# Patient Record
Sex: MALE | Race: WHITE
[De-identification: names, ages, dates, MRNs, and addresses within clinical notes are randomized per-mention and may not be internally consistent; named-entity substitution may affect disease eponyms.]

---

## 2017-04-17 ENCOUNTER — HOSPITAL ENCOUNTER (EMERGENCY)
Dept: HOSPITAL 62 - ER | Age: 27
Discharge: HOME | End: 2017-04-17
Payer: COMMERCIAL

## 2017-04-17 VITALS — DIASTOLIC BLOOD PRESSURE: 72 MMHG | SYSTOLIC BLOOD PRESSURE: 127 MMHG

## 2017-04-17 DIAGNOSIS — Z87.442: ICD-10-CM

## 2017-04-17 DIAGNOSIS — N20.0: Primary | ICD-10-CM

## 2017-04-17 DIAGNOSIS — R10.31: ICD-10-CM

## 2017-04-17 DIAGNOSIS — R11.10: ICD-10-CM

## 2017-04-17 LAB
APPEARANCE UR: (no result)
BILIRUB UR QL STRIP: (no result)
GLUCOSE UR STRIP-MCNC: NEGATIVE MG/DL
KETONES UR STRIP-MCNC: (no result) MG/DL
NITRITE UR QL STRIP: NEGATIVE
PH UR STRIP: 5 [PH] (ref 5–9)
PROT UR STRIP-MCNC: >=500 MG/DL
SP GR UR STRIP: 1.04
UROBILINOGEN UR-MCNC: 4 MG/DL (ref ?–2)

## 2017-04-17 PROCEDURE — S0119 ONDANSETRON 4 MG: HCPCS

## 2017-04-17 PROCEDURE — 96372 THER/PROPH/DIAG INJ SC/IM: CPT

## 2017-04-17 PROCEDURE — 81001 URINALYSIS AUTO W/SCOPE: CPT

## 2017-04-17 PROCEDURE — 99284 EMERGENCY DEPT VISIT MOD MDM: CPT

## 2017-04-17 NOTE — ER DOCUMENT REPORT
ED General





- General


Chief Complaint: Abdominal Pain


Stated Complaint: ABDOMINAL PAIN


Notes: 


Patient is a 26-year-old male past medical history of nephrolithiasis who 

presents with acute onset of right flank pain radiating to his right lower 

abdomen.  States this started acutely and was severe in nature.  Described as a 

constant, stabbing pain.  Nothing worsened his pain.  States at the time of my 

assessment that his pain has completely resolved after receiving Toradol and 

oxycodone prior to my assessment.  States this feels identical to his prior 

episodes of nephrolithiasis in the past.  He has not seen his primary doctor 

regarding today's concerns.  He has never had a complicated kidney stone 

required surgical management.  He did have one episode of vomiting today but 

has otherwise been able to tolerate oral intake.  He denies any pain at the 

time of my assessment.  Denies any testicular pain or focal abdominal pain.


TRAVEL OUTSIDE OF THE [place] IN LAST 30 DAYS: No





Past Medical History





- General


Information source: Patient





- Social History


Smoking Status: Never Smoker


Frequency of alcohol use: None


Drug Abuse: None


Lives with: Spouse/Significant other


Family History: Reviewed & Not Pertinent


Patient has suicidal ideation: No


Patient has homicidal ideation: No


Renal/ Medical History: Denies: Hx Peritoneal Dialysis





Review of Systems





- Review of Systems


Notes: 


Constitutional: Negative for fever.


HENT: Negative for sore throat.


Eyes: Negative for visual changes.


Cardiovascular: Negative for chest pain.


Respiratory: Negative for shortness of breath.


Gastrointestinal: Negative for abdominal pain, vomiting or diarrhea.  Positive 

for right flank pain


Genitourinary: Negative for dysuria.


Musculoskeletal: Negative for back pain.


Skin: Negative for rash.


Neurological: Negative for headaches, weakness or numbness.





10 point ROS negative except as marked above and in HPI.





Physical Exam





- Vital signs


Vitals: 


 











Temp Pulse Resp BP Pulse Ox


 


 97.8 F   65   20   127/77 H  100 


 


 04/17/17 14:07  04/17/17 14:07  04/17/17 14:07  04/17/17 14:07  04/17/17 14:07











Interpretation: Normal


Notes: 


PHYSICAL EXAMINATION:





GENERAL: Well-appearing, well-nourished and in no acute distress.





HEAD: Atraumatic, normocephalic.





EYES: Pupils equal round and reactive to light, extraocular movements intact, 

sclera anicteric, conjunctiva are normal.





ENT: nares patent, oropharynx clear without exudates.  Moist mucous membranes.





NECK: Normal range of motion, supple without lymphadenopathy





LUNGS: Breath sounds clear to auscultation bilaterally and equal.  No wheezes 

rales or rhonchi.





HEART: Regular rate and rhythm without murmurs





ABDOMEN: Soft, nontender, normoactive bowel sounds.  No guarding, no rebound.  

No masses appreciated.





EXTREMITIES: Normal range of motion, no pitting or edema.  No cyanosis.





NEUROLOGICAL: No focal neurological deficits. Moves all extremities 

spontaneously and on command.





PSYCH: Normal mood, normal affect.





SKIN: Warm, Dry, normal turgor, no rashes or lesions noted.





Course





- Re-evaluation


Re-evalutation: 


04/17/17 18:52


Presents with findings consistent with acute nephrolithiasis.  Urinalysis does 

show hematuria.  Laboratory otherwise unremarkable.  Pain was able to be 

controlled here in the emergency department.  Patient is tolerating oral 

intake.  Clinical history is not consistent with an acute abdominal aneurysm or 

dissection, MI, or pulmonary embolus.  Urinalysis does not show findings 

consistent with an infected stone.  Vitals have remained within normal limits.  

Patient will be discharged with recommendations to follow-up with urology, pain 

medications, and return precautions.  At this time will discharge with return 

precautions and follow-up recommendations.  Verbal discharge instructions given 

a the bedside and opportunity for questions given. Medication warnings 

reviewed. Patient is in agreement with this plan and has verbalized 

understanding of return precautions and the need for primary care follow-up in 

the next 24-72 hours.











- Vital Signs


Vital signs: 


 











Temp Pulse Resp BP Pulse Ox


 


 97.9 F   68   16   127/72 H  98 


 


 04/17/17 19:05  04/17/17 19:05  04/17/17 19:05  04/17/17 19:05  04/17/17 19:05














- Laboratory


Laboratory results interpreted by me: 


 











  04/17/17





  14:10


 


Urine Protein  >=500 H


 


Urine Ketones  TRACE H


 


Urine Blood  LARGE H


 


Urine Bilirubin  SMALL H


 


Urine Urobilinogen  4.0 H


 


Urine Ascorbic Acid  20 H














Discharge





- Discharge


Clinical Impression: 


 Kidney stone on right side





Condition: Good


Disposition: HOME, SELF-CARE


Additional Instructions: 


Your symptoms should improve over the course of the next one week.  If you 

continue to have pain for greater than one week or your pain is not controlled 

with the pain medications that you have been sent home with you need to return 

to the emergency department.  Please also return if you develop fever, 

persistent vomiting, or any other symptoms that are concerning to you.  You 

should take ibuprofen 600 mg every 6 hours and use the Percocet as prescribed 

only for pain not controlled by ibuprofen.  Your also been sent home with a 

medication called Flomax to help pass the stone.  You've been given Zofran to 

assist with nausea.  Please followup closely with your primary care provider.


Prescriptions: 


Hydrocodone/Acetaminophen [Norco 5-325 mg Tablet] 1 - 2 tab PO Q4HP PRN #20 

tablet


 PRN Reason: 


Ondansetron HCl [Zofran 4 mg Tablet] 1 - 2 tab PO Q4H PRN #10 tablet


 PRN Reason: 


Tamsulosin HCl [Flomax 0.4 mg Cap.sr] 0.4 mg PO DAILY #7 cap.sr.24h


Referrals: 


ILIANA OLIVER MD [[facility]] - Follow up in 1 week

## 2017-04-17 NOTE — ER DOCUMENT REPORT
ED Medical Screen (RME)





- General


Chief Complaint: Abdominal Pain


Stated Complaint: ABDOMIANL PAIN


Notes: 


This 26-year-old male patient comes from complaining of severe right flank pain 

moving toward the front of the abdomen.  Started about 1 PM today.  His last 

kidney stone was in 2010.  He has nausea and has vomited once.  He is pacing 

the floor hunched over holding his side moaning and groaning.


He has no other medical problems and no medications.





I have greeted and performed a rapid initial assessment of this patient.  A 

comprehensive ED assessment and evaluation of the patient, analysis of test 

results and completion of the medical decision making process will be conducted 

by additional ED providers.


TRAVEL OUTSIDE OF THE [place] IN LAST 30 DAYS: No





Past Medical History


Renal/ Medical History: Denies: Hx Peritoneal Dialysis





Physical Exam





- Vital signs


Vitals: 





 











Temp Pulse Resp BP Pulse Ox


 


 97.8 F   65   20   127/77 H  100 


 


 04/17/17 14:07  04/17/17 14:07  04/17/17 14:07  04/17/17 14:07  04/17/17 14:07














Course





- Vital Signs


Vital signs: 





 











Temp Pulse Resp BP Pulse Ox


 


 97.8 F   65   20   127/77 H  100 


 


 04/17/17 14:07  04/17/17 14:07  04/17/17 14:07  04/17/17 14:07  04/17/17 14:07

## 2020-05-20 ENCOUNTER — HOSPITAL ENCOUNTER (EMERGENCY)
Dept: HOSPITAL 62 - ER | Age: 30
Discharge: HOME | End: 2020-05-20
Payer: COMMERCIAL

## 2020-05-20 VITALS — DIASTOLIC BLOOD PRESSURE: 67 MMHG | SYSTOLIC BLOOD PRESSURE: 105 MMHG

## 2020-05-20 DIAGNOSIS — R61: ICD-10-CM

## 2020-05-20 DIAGNOSIS — R94.4: ICD-10-CM

## 2020-05-20 DIAGNOSIS — N13.1: Primary | ICD-10-CM

## 2020-05-20 DIAGNOSIS — Z79.899: ICD-10-CM

## 2020-05-20 DIAGNOSIS — R10.9: ICD-10-CM

## 2020-05-20 DIAGNOSIS — R11.10: ICD-10-CM

## 2020-05-20 LAB
ADD MANUAL DIFF: NO
ALBUMIN SERPL-MCNC: 4.6 G/DL (ref 3.5–5)
ALP SERPL-CCNC: 72 U/L (ref 38–126)
ANION GAP SERPL CALC-SCNC: 11 MMOL/L (ref 5–19)
APPEARANCE UR: (no result)
APTT PPP: YELLOW S
AST SERPL-CCNC: 23 U/L (ref 17–59)
BASOPHILS # BLD AUTO: 0 10^3/UL (ref 0–0.2)
BASOPHILS NFR BLD AUTO: 0.2 % (ref 0–2)
BILIRUB DIRECT SERPL-MCNC: 0 MG/DL (ref 0–0.4)
BILIRUB SERPL-MCNC: 0.6 MG/DL (ref 0.2–1.3)
BILIRUB UR QL STRIP: NEGATIVE
BUN SERPL-MCNC: 21 MG/DL (ref 7–20)
CALCIUM: 9.2 MG/DL (ref 8.4–10.2)
CHLORIDE SERPL-SCNC: 101 MMOL/L (ref 98–107)
CO2 SERPL-SCNC: 21 MMOL/L (ref 22–30)
EOSINOPHIL # BLD AUTO: 0 10^3/UL (ref 0–0.6)
EOSINOPHIL NFR BLD AUTO: 0.1 % (ref 0–6)
ERYTHROCYTE [DISTWIDTH] IN BLOOD BY AUTOMATED COUNT: 12.4 % (ref 11.5–14)
GLUCOSE SERPL-MCNC: 111 MG/DL (ref 75–110)
GLUCOSE UR STRIP-MCNC: NEGATIVE MG/DL
HCT VFR BLD CALC: 45.8 % (ref 37.9–51)
HGB BLD-MCNC: 16.4 G/DL (ref 13.5–17)
KETONES UR STRIP-MCNC: (no result) MG/DL
LYMPHOCYTES # BLD AUTO: 0.8 10^3/UL (ref 0.5–4.7)
LYMPHOCYTES NFR BLD AUTO: 6.4 % (ref 13–45)
MCH RBC QN AUTO: 31.2 PG (ref 27–33.4)
MCHC RBC AUTO-ENTMCNC: 35.8 G/DL (ref 32–36)
MCV RBC AUTO: 87 FL (ref 80–97)
MONOCYTES # BLD AUTO: 0.5 10^3/UL (ref 0.1–1.4)
MONOCYTES NFR BLD AUTO: 3.7 % (ref 3–13)
NEUTROPHILS # BLD AUTO: 11.9 10^3/UL (ref 1.7–8.2)
NEUTS SEG NFR BLD AUTO: 89.6 % (ref 42–78)
NITRITE UR QL STRIP: NEGATIVE
PH UR STRIP: 5 [PH] (ref 5–9)
PLATELET # BLD: 169 10^3/UL (ref 150–450)
POTASSIUM SERPL-SCNC: 3.9 MMOL/L (ref 3.6–5)
PROT SERPL-MCNC: 7.1 G/DL (ref 6.3–8.2)
PROT UR STRIP-MCNC: 30 MG/DL
RBC # BLD AUTO: 5.26 10^6/UL (ref 4.35–5.55)
SP GR UR STRIP: 1.03
TOTAL CELLS COUNTED % (AUTO): 100 %
UROBILINOGEN UR-MCNC: NEGATIVE MG/DL (ref ?–2)
WBC # BLD AUTO: 13.3 10^3/UL (ref 4–10.5)

## 2020-05-20 PROCEDURE — 80053 COMPREHEN METABOLIC PANEL: CPT

## 2020-05-20 PROCEDURE — 96375 TX/PRO/DX INJ NEW DRUG ADDON: CPT

## 2020-05-20 PROCEDURE — 81001 URINALYSIS AUTO W/SCOPE: CPT

## 2020-05-20 PROCEDURE — 74176 CT ABD & PELVIS W/O CONTRAST: CPT

## 2020-05-20 PROCEDURE — 85025 COMPLETE CBC W/AUTO DIFF WBC: CPT

## 2020-05-20 PROCEDURE — 96374 THER/PROPH/DIAG INJ IV PUSH: CPT

## 2020-05-20 PROCEDURE — 36415 COLL VENOUS BLD VENIPUNCTURE: CPT

## 2020-05-20 PROCEDURE — 99284 EMERGENCY DEPT VISIT MOD MDM: CPT

## 2020-05-20 NOTE — ER DOCUMENT REPORT
ED General





- General


Chief Complaint: Flank Pain


Stated Complaint: WEAKNESS


Time Seen by Provider: 05/20/20 15:41


TRAVEL OUTSIDE OF THE [place] IN LAST 30 DAYS: No





- HPI


Notes: 





Patient is a 29-year-old male with a history of kidney stones who presents to 

the emergency department for evaluation of left flank pain and vomiting, 

consistent with stones in the past.  He states that started last night.  He has 

had vomited too many times to count, nonbloody, nonbilious.  He denies any 

fevers or chills.  He does not see any gross blood in his urine.  He rates his 

pain currently a 10 out of 10, he got little in the way of relief with the 

Toradol given to him in route.





- Related Data


Allergies/Adverse Reactions: 


                                        





No Known Allergies Allergy (Unverified 05/20/20 15:40)


   








Home Medications: Amitriptyline





Past Medical History





- General


Information source: Patient





- Social History


Smoking Status: Never Smoker


Chew tobacco use (# tins/day): No


Frequency of alcohol use: Occasional


Drug Abuse: None


Family History: Reviewed & Not Pertinent


Patient has homicidal ideation: No


Neurological Medical History: Reports: Hx Migraine


Renal/ Medical History: Reports: Hx Kidney Stones.  Denies: Hx Peritoneal 

Dialysis


Psychiatric Medical History: Reports: Hx Post Traumatic Stress Disorder





- Immunizations


Hx Diphtheria, Pertussis, Tetanus Vaccination: Yes





Review of Systems





- Review of Systems


Gastrointestinal: See HPI


Genitourinary: See HPI


-: Yes All other systems reviewed and negative





Physical Exam





- Vital signs


Vitals: 


                                        











Temp


 


 97.7 F 


 


 05/20/20 15:00














- Notes


Notes: 





This is a 29-year-old male appears his stated age in a moderate amount of 

distress.  He is pacing around the room.  He is mildly diaphoretic, grasping at 

his left flank.   vital signs reviewed, please refer to chart. Head is 

normocephalic, atraumatic.  Pupils equal round, reactive to light.  Neck is 

supple without meningismus.  Heart is regular rate and rhythm.  Lungs are clear 

to auscultation bilaterally.  Abdomen is soft, nontender, normoactive bowel 

sounds throughout.  No CVA tenderness.  Patient is tender to palpation at 

approximately L3 on the left with associated spasm.  Extremities without 

cyanosis, clubbing. Posterior calves are nontender.  Peripheral pulses are 

equal.  Patient is awake, alert, neurological exam is nonfocal.





Course





- Re-evaluation


Re-evalutation: 





05/20/20 15:50


Patient presents to the emergency department for evaluation.  He symptoms are 

most consistent with renal colic.  I will did give him some stronger pain 

medication, nausea medication, and sent in for CT scan.  Lab work is ordered.  

Patient is stable at this time, we will continue to monitor.





- Vital Signs


Vital signs: 


                                        











Temp Pulse Resp BP Pulse Ox


 


 97.7 F             


 


 05/20/20 15:00            














- Laboratory


Result Diagrams: 


                                 05/20/20 16:01





                                 05/20/20 16:01


Laboratory results interpreted by me: 


                                        











  05/20/20 05/20/20 05/20/20





  16:01 16:01 16:02


 


WBC  13.3 H  


 


Lymph % (Auto)  6.4 L  


 


Absolute Neuts (auto)  11.9 H  


 


Seg Neutrophils %  89.6 H  


 


Sodium   133.1 L 


 


Carbon Dioxide   21 L 


 


BUN   21 H 


 


Creatinine   1.38 H 


 


Glucose   111 H 


 


Urine Protein    30 H


 


Urine Ketones    TRACE H


 


Urine Blood    LARGE H














- Diagnostic Test


Radiology reviewed: Image reviewed, Reports reviewed


Radiology results interpreted by me: 





05/20/20 18:22





                                        





Abdomen/Pelvis CT  05/20/20 15:47


IMPRESSION:  Mild left-sided hydronephrosis and hydroureter secondary to 2 small

distal left ureteral stones.  The largest measures 3.5 mm.


 














Discharge





- Discharge


Clinical Impression: 


 Ureteral stone with hydronephrosis, Abnormal renal function





Condition: Stable


Disposition: HOME, SELF-CARE


Instructions:  Kidney Stone ([facility]), Kidney Function Abnormality ([facility])


Additional Instructions: 


Your kidney function was mildly elevated, with a creatinine of 1.38.  This is 

only mildly abnormal, but should be rechecked.  Stay hydrated.  Take medications

as prescribed.  Follow-up with primary care next week.  If you develop fevers, 

increased vomiting, increased pain, or any other new or concerning symptoms, 

please return immediately to the emergency department for evaluation.

## 2020-05-20 NOTE — RADIOLOGY REPORT (SQ)
EXAM DESCRIPTION:  CT ABD/PELVIS NO ORAL OR IV



IMAGES COMPLETED DATE/TIME:  5/20/2020 4:20 pm



REASON FOR STUDY:  left flank pain, eval for stone



COMPARISON:  None.



TECHNIQUE:  CT scan of the abdomen and pelvis performed without intravenous or oral contrast. Images 
reviewed with lung, soft tissue, and bone windows. Reconstructed coronal and sagittal MPR images revi
ewed. All images stored on PACS.

All CT scanners at this facility use dose modulation, iterative reconstruction, and/or weight based d
osing when appropriate to reduce radiation dose to as low as reasonably achievable (ALARA).

[facility]: Dose Right  [facility]: CareDose    [facility]: Dose Right    [facility]: Teradose 4D    [facility]: Smart Technologies



RADIATION DOSE:  mGy.



LIMITATIONS:  None.



FINDINGS:  LOWER CHEST: No significant findings. No nodules or infiltrates.

NON-CONTRASTED LIVER, SPLEEN, ADRENALS: Evaluation limited by lack of IV contrast. No identified sign
ificant masses.

PANCREAS: No masses. No peripancreatic inflammatory changes.

GALLBLADDER: No identified stones by CT criteria. No inflammatory changes to suggest cholecystitis.

RIGHT KIDNEY AND URETER: No suspicious masses. Assessment limited by lack of IV contrast.   Small non
obstructing right renal stones.   No hydronephrosis or hydroureter.

LEFT KIDNEY AND URETER: No suspicious masses. Assessment limited by lack of IV contrast.   2 small di
stal left ureteral stone just proximal to the UPJ.  The largest measures 3.5 mm.   Mild left-sided hy
dronephrosis and hydroureter.  There is periureteral stranding.

AORTA AND RETROPERITONEUM: No aneurysm. No retroperitoneal masses or adenopathy.

BOWEL AND PERITONEAL CAVITY: No obvious masses or inflammatory changes. No free fluid.

APPENDIX: Normal.

PELVIS, BLADDER, AND ABDOMINAL WALL:No abnormal masses. No free fluid. Bladder normal.

BONES: No significant findings.

OTHER: No other significant finding.



IMPRESSION:  Mild left-sided hydronephrosis and hydroureter secondary to 2 small distal left ureteral
 stones.  The largest measures 3.5 mm.



COMMENT:  Quality ID # [id number]: Final reports with documentation of one or more dose reduction techniques
 (e.g., Automated exposure control, adjustment of the mA and/or kV according to patient size, use of 
iterative reconstruction technique)



TECHNICAL DOCUMENTATION:  JOB ID:  [id number]

 2011 [employer]- All Rights Reserved



Reading location - IP[facility]workstation name: [facility]